# Patient Record
Sex: MALE | Race: OTHER | NOT HISPANIC OR LATINO
[De-identification: names, ages, dates, MRNs, and addresses within clinical notes are randomized per-mention and may not be internally consistent; named-entity substitution may affect disease eponyms.]

---

## 2017-02-07 ENCOUNTER — APPOINTMENT (OUTPATIENT)
Dept: UROLOGY | Facility: CLINIC | Age: 67
End: 2017-02-07

## 2017-02-16 ENCOUNTER — APPOINTMENT (OUTPATIENT)
Dept: UROLOGY | Facility: CLINIC | Age: 67
End: 2017-02-16

## 2017-07-06 ENCOUNTER — RESULT REVIEW (OUTPATIENT)
Age: 67
End: 2017-07-06

## 2017-08-03 ENCOUNTER — APPOINTMENT (OUTPATIENT)
Dept: UROLOGY | Facility: CLINIC | Age: 67
End: 2017-08-03
Payer: MEDICARE

## 2017-08-03 VITALS
SYSTOLIC BLOOD PRESSURE: 129 MMHG | TEMPERATURE: 98.8 F | HEIGHT: 69 IN | BODY MASS INDEX: 25.92 KG/M2 | DIASTOLIC BLOOD PRESSURE: 73 MMHG | WEIGHT: 175 LBS | HEART RATE: 74 BPM

## 2017-08-03 PROCEDURE — 99214 OFFICE O/P EST MOD 30 MIN: CPT

## 2017-08-07 ENCOUNTER — TRANSCRIPTION ENCOUNTER (OUTPATIENT)
Age: 67
End: 2017-08-07

## 2018-02-15 ENCOUNTER — APPOINTMENT (OUTPATIENT)
Dept: UROLOGY | Facility: CLINIC | Age: 68
End: 2018-02-15
Payer: MEDICARE

## 2018-02-15 PROCEDURE — 99214 OFFICE O/P EST MOD 30 MIN: CPT

## 2018-02-15 RX ORDER — TERBINAFINE HYDROCHLORIDE 250 MG/1
250 TABLET ORAL
Qty: 14 | Refills: 0 | Status: ACTIVE | COMMUNITY
Start: 2017-09-13

## 2018-02-15 RX ORDER — HYDROCORTISONE BUTYRATE 1 MG/G
0.1 CREAM TOPICAL
Qty: 60 | Refills: 0 | Status: ACTIVE | COMMUNITY
Start: 2017-08-15

## 2018-02-15 RX ORDER — CICLOPIROX OLAMINE 7.7 MG/G
0.77 CREAM TOPICAL
Qty: 90 | Refills: 0 | Status: ACTIVE | COMMUNITY
Start: 2017-09-13

## 2018-02-15 RX ORDER — HYDROCORTISONE ACETATE 25 MG/1
25 SUPPOSITORY RECTAL
Qty: 60 | Refills: 0 | Status: ACTIVE | COMMUNITY
Start: 2017-12-27

## 2018-02-15 RX ORDER — CLOTRIMAZOLE 10 MG/G
1 CREAM TOPICAL
Qty: 30 | Refills: 0 | Status: ACTIVE | COMMUNITY
Start: 2017-08-14

## 2018-02-15 RX ORDER — VALSARTAN 160 MG/1
160 TABLET, COATED ORAL
Qty: 90 | Refills: 0 | Status: ACTIVE | COMMUNITY
Start: 2017-12-27

## 2018-02-24 LAB
PSA FREE FLD-MCNC: 23.4
PSA FREE SERPL-MCNC: 2.23 NG/ML
PSA SERPL-MCNC: 9.55 NG/ML

## 2018-04-30 ENCOUNTER — FORM ENCOUNTER (OUTPATIENT)
Age: 68
End: 2018-04-30

## 2018-05-01 ENCOUNTER — APPOINTMENT (OUTPATIENT)
Dept: MRI IMAGING | Facility: IMAGING CENTER | Age: 68
End: 2018-05-01
Payer: MEDICARE

## 2018-05-01 ENCOUNTER — OUTPATIENT (OUTPATIENT)
Dept: OUTPATIENT SERVICES | Facility: HOSPITAL | Age: 68
LOS: 1 days | End: 2018-05-01
Payer: MEDICARE

## 2018-05-01 DIAGNOSIS — C61 MALIGNANT NEOPLASM OF PROSTATE: ICD-10-CM

## 2018-05-01 DIAGNOSIS — R97.20 ELEVATED PROSTATE SPECIFIC ANTIGEN [PSA]: ICD-10-CM

## 2018-05-01 DIAGNOSIS — Z00.00 ENCOUNTER FOR GENERAL ADULT MEDICAL EXAMINATION WITHOUT ABNORMAL FINDINGS: ICD-10-CM

## 2018-05-01 PROCEDURE — A9585: CPT

## 2018-05-01 PROCEDURE — 72197 MRI PELVIS W/O & W/DYE: CPT | Mod: 26

## 2018-05-01 PROCEDURE — 82565 ASSAY OF CREATININE: CPT

## 2018-05-01 PROCEDURE — 72197 MRI PELVIS W/O & W/DYE: CPT

## 2018-10-04 ENCOUNTER — APPOINTMENT (OUTPATIENT)
Dept: UROLOGY | Facility: CLINIC | Age: 68
End: 2018-10-04
Payer: MEDICARE

## 2018-10-04 PROCEDURE — 99214 OFFICE O/P EST MOD 30 MIN: CPT

## 2019-05-23 ENCOUNTER — APPOINTMENT (OUTPATIENT)
Dept: UROLOGY | Facility: CLINIC | Age: 69
End: 2019-05-23
Payer: MEDICARE

## 2019-05-23 DIAGNOSIS — Z00.00 ENCOUNTER FOR GENERAL ADULT MEDICAL EXAMINATION W/OUT ABNORMAL FINDINGS: ICD-10-CM

## 2019-05-23 DIAGNOSIS — R39.12 POOR URINARY STREAM: ICD-10-CM

## 2019-05-23 DIAGNOSIS — C73 MALIGNANT NEOPLASM OF THYROID GLAND: ICD-10-CM

## 2019-05-23 PROCEDURE — 99214 OFFICE O/P EST MOD 30 MIN: CPT

## 2019-06-08 ENCOUNTER — TRANSCRIPTION ENCOUNTER (OUTPATIENT)
Age: 69
End: 2019-06-08

## 2019-07-09 NOTE — LETTER BODY
[FreeTextEntry1] : Ramirez Whitley MD\par [address]\par [place], [place] [address]\par Phone [phone]\par \par Jarvis Wilkins MD\par [address] [place] [address], [address] Floor\[place] [address]\par [phone]\par \par Dear Dr. Wilkins and Dr. Whitley,\par \par Reason for Visit: BPH. Prostate cancer on active surveillance\par \par This is a 68 year-old gentleman with BPH and prostate cancer diagnosed in 2015 post prostate biopsy in 2015 and chronic BPH. Patient was found to have a small focus of [place] 6 prostate cancer. He received an unremarkable prostate MRI in 2016. Patient is on active surveillance and returns today for followup. Since the patient's last visit, he reports he is doing well. Patient's current PSA is 5.7. Patient continues to take Flomax BID regularly without any side effects or difficulties with the medication. Patient reports improvement in his urinary flow. Patient denies any urinary retention or hematuria or changes in health. The past medical history, family history and social history are unchanged. All other review of systems are negative. Patient denies any changes in medications. Medication list was reconciled.\par \par On examination, the patient is a healthy-appearing gentleman in no acute distress. He is alert and oriented follows commands. He has normal mood and affect. He is normocephalic. Neck is supple. Respirations are unlabored. His abdomen is soft and nontender. Bladder is nonpalpable. No CVA tenderness. Neurologically he is grossly intact. No peripheral edema. Skin without gross abnormality. He has normal male external genitalia. Normal meatus. Bilateral testes are descended intrascrotally and normal to palpation. On rectal examination, there is normal sphincter tone. The prostate is clinically benign without focal induration or nodularity.\par \par Patient's current PSA is 5.7, which is stable. Previous PSA was 5.6.\par \par His previous prostate MRI was unremarkable PIRAD 1.\par \par Assessment: BPH. Prostate cancer on active surveillance.\par \par I counseled the patient. In terms of his BPH, his symptoms are stable on Flomax BID. I renewed the patient's prescription for Flomax today. I encouraged the patient to continue medications regularly as directed. Risks and alternatives were discussed. I answered the patient questions. The patient will follow-up as directed and will contact me with any questions or concerns. Thank you for the opportunity to participate in the care of this patient. I'll keep you updated on his progress.\par \par Plan: Continue Flomax BID. Follow up 6 months.

## 2019-07-09 NOTE — LETTER BODY
[FreeTextEntry1] : Ramirez Whitley MD\par [address]\par [place], [place] [address]\par Phone [phone]\par \par Jarvis Wilkins MD\par [address] [place] [address], [address] Floor\[place] [address]\par [phone]\par \par Dear Dr. Wilkins and Dr. Whitley,\par \par Reason for Visit: BPH. Prostate cancer on active surveillance\par \par This is a 68 year-old gentleman with BPH and prostate cancer diagnosed in 2015 post prostate biopsy in 2015 and chronic BPH. Patient was found to have a small focus of [place] 6 prostate cancer. He received an unremarkable prostate MRI in 2016. Patient is on active surveillance and returns today for followup. Since the patient's last visit, he reports he is doing well. Patient's current PSA is 5.7. Patient continues to take Flomax BID regularly without any side effects or difficulties with the medication. Patient reports improvement in his urinary flow. Patient denies any urinary retention or hematuria or changes in health. The past medical history, family history and social history are unchanged. All other review of systems are negative. Patient denies any changes in medications. Medication list was reconciled.\par \par On examination, the patient is a healthy-appearing gentleman in no acute distress. He is alert and oriented follows commands. He has normal mood and affect. He is normocephalic. Neck is supple. Respirations are unlabored. His abdomen is soft and nontender. Bladder is nonpalpable. No CVA tenderness. Neurologically he is grossly intact. No peripheral edema. Skin without gross abnormality. He has normal male external genitalia. Normal meatus. Bilateral testes are descended intrascrotally and normal to palpation. On rectal examination, there is normal sphincter tone. The prostate is clinically benign without focal induration or nodularity.\par \par Patient's current PSA is 5.7, which is stable. Previous PSA was 5.6.\par \par His previous prostate MRI was unremarkable PIRAD 1.\par \par Assessment: BPH. Prostate cancer on active surveillance.\par \par I counseled the patient. In terms of his BPH, his symptoms are stable on Flomax BID. I renewed the patient's prescription for Flomax today. I encouraged the patient to continue medications regularly as directed. Risks and alternatives were discussed. I answered the patient questions. The patient will follow-up as directed and will contact me with any questions or concerns. Thank you for the opportunity to participate in the care of this patient. I'll keep you updated on his progress.\par \par Plan: Continue Flomax BID. Follow up 6 months.

## 2019-07-09 NOTE — ADDENDUM
[FreeTextEntry1] : Entered by Genna Rand, acting as scribe for Dr. David Haines on 05/23/2019\par \par The documentation recorded by the scribe accurately reflects the service I personally performed and the decisions made by me.\par \par

## 2020-01-30 ENCOUNTER — APPOINTMENT (OUTPATIENT)
Dept: UROLOGY | Facility: CLINIC | Age: 70
End: 2020-01-30
Payer: MEDICARE

## 2020-01-30 PROCEDURE — 99214 OFFICE O/P EST MOD 30 MIN: CPT

## 2020-01-30 NOTE — LETTER BODY
[FreeTextEntry1] : Jarvis Wilkins MD\par [address] [place] [address], [address] Floor\[place] [address]\par [phone]\par \par Dear Dr. Wilkins,\par \par Reason for Visit: BPH. Prostate cancer on active surveillance\par \par This is a 69 year-old gentleman with chronic BPH and prostate cancer diagnosed in 2015. Patient was found to have a small focus of [place] 6 prostate cancer. Patient is on active surveillance. He obtained negative prostate MRI's in 2016 and 2018. He returns today for follow up. Patient's current PSA is 11. Since his last visit, he reports that he was sick with an excess of phlegm and mucus, and was placed on antibiotics. He is unclear of the etiology of his symptoms and was not diagnosed with an illness. He reports he did not have a headache or dysuria. He reports he is feeling much better now. He inquires if his illness could have led to the spike in his PSA and reports that he will be repeating PSA testing in 3 months. Patient continues to take Flomax BID regularly without any side effects or difficulties. Patient reports of stable urinary symptoms and strong uroflow. He reports of occasional nocturia if he hydrates frequently before bed. Patient denies any urinary retention, dysuria, or hematuria. He denies any changes in health. He has no pain currently. The past medical history, family history and social history are unchanged. All other review of systems are negative. Patient denies any changes in medications. Medication list was reconciled. He has no known allergies to medication.\par \par On examination, the patient is a healthy-appearing gentleman in no acute distress. He is alert and oriented follows commands. He has normal mood and affect. He is normocephalic. Neck is supple. Respirations are unlabored. His abdomen is soft and nontender. Bladder is nonpalpable. No CVA tenderness. Neurologically he is grossly intact. No peripheral edema. Skin without gross abnormality. He has normal male external genitalia. Normal meatus. Bilateral testes are descended intrascrotally and normal to palpation. On rectal examination, there is normal sphincter tone. The prostate is clinically benign without focal induration or nodularity.\par \par His BMP demonstrated decreased. renal functions, creatinine 1.27. Patient's current PSA is 11, which is elevated. His previous PSA was 5.7.\par \par Assessment: BPH. Elevated PSA. Prostate cancer on active surveillance.\par \par I counseled the patient. I explained that his illness could have affected his PSA, leading to its sudden spike. I recommend he contact me with his repeat PSA results to ensure stability. I also recommend he add Proscar. I discussed the potential side effects of the medication. I counseled the patient on its use and side effects. If the patient develops any side effects, the patient will discontinue the medication and contact me. In terms of his BPH, his symptoms are stable on medication. I renewed his prescription for Flomax BID today. I encouraged him to continue medication regularly as directed. Risks and alternatives were discussed. I answered the patient questions. The patient will follow-up as directed and will contact me with any questions or concerns. Thank you for the opportunity to participate in the care of Mr. BARNES. I will keep you updated on his progress. \par \par Plan: Continue Flomax BID. Add Proscar. Active surveillance. Follow up as directed.

## 2020-01-30 NOTE — ADDENDUM
[FreeTextEntry1] : Entered by Ronny Oconnor, acting as scribe for Dr. David Haines.\par \par The documentation recorded by the scribe accurately reflects the service I personally performed and the decisions made by me.

## 2020-04-24 ENCOUNTER — TRANSCRIPTION ENCOUNTER (OUTPATIENT)
Age: 70
End: 2020-04-24

## 2020-06-12 ENCOUNTER — TRANSCRIPTION ENCOUNTER (OUTPATIENT)
Age: 70
End: 2020-06-12

## 2020-06-16 ENCOUNTER — APPOINTMENT (OUTPATIENT)
Dept: UROLOGY | Facility: CLINIC | Age: 70
End: 2020-06-16
Payer: MEDICARE

## 2020-06-16 PROCEDURE — 99214 OFFICE O/P EST MOD 30 MIN: CPT | Mod: 95

## 2020-06-16 NOTE — LETTER BODY
[FreeTextEntry1] : Jarvis Wilkins MD\par [address] [place] [address], [address]\[place] [address]\par [phone]\par \par Dear Dr. Wilkins,\par \par Reason for Visit: BPH. Prostate cancer on active surveillance\par \par This is a 69 year-old gentleman with chronic BPH and prostate cancer diagnosed in 2015. Patient was found to have a small focus of [place] 6 prostate cancer. Patient is on active surveillance. He obtained negative prostate MRI's in 2016 and 2018. His prostate MRI in May 2018 demonstrated PI-RADS 1. Patient was at home and consulted over the phone through a TeleHealth visit. Verbal consent was obtained. I was in the office in [place]. Patient's current PSA is 4.06. Since his last visit, he reports taking Flomax BID and Proscar regularly without any side effects or difficulties. Patient reports of stable urinary symptoms and strong uroflow with medication. Patient denies any urinary retention, dysuria, or hematuria. He denies any changes in health. He has no pain currently. The past medical history, family history and social history are unchanged. All other review of systems are negative. Patient denies any changes in medications. Medication list was reconciled. He has no known allergies to medication.\par \par Given that this was a TeleHealth visit and I was unable to physically examine the patient, his/her physical examination is deferred. \par \par Patient's current PSA on Proscar is 4.06.\par \par Assessment: BPH, stable with Flomax BID. Elevated PSA, improved with Proscar. Prostate cancer on active surveillance.\par \par I counseled the patient. In terms of his BPH, his symptoms are stable with medical therapy. In terms of his elevated PSA, his PSA has improved with Proscar. I renewed the patient's prescription for Flomax BID and Proscar today. I encouraged the patient to continue medications regularly as directed. Risks and alternatives were discussed. I answered the patient questions. The patient will follow-up as directed and will contact me with any questions or concerns. Thank you for the opportunity to participate in the care of Mr. BARNES. I will keep you updated on his progress. \par \par Plan: Continue Flomax BID and Proscar. Active surveillance. Follow up in 6 months.

## 2020-06-16 NOTE — HISTORY OF PRESENT ILLNESS
[Home] : at home, [unfilled] , at the time of the visit. [Medical Office: ([facility])___] : at the medical office located in  [Verbal consent obtained from patient] : the patient, [unfilled] [FreeTextEntry1] : CONFIRMED PT's PHONE#: [phone]\par \par The patient-doctor relationship has been established in a face to face fashion via real time video/audio HIPAA compliant communication using telemedicine software. The patient's identity has been confirmed. The patient was previously emailed a copy of the telemedicine consent. They have had a chance to review and has now given verbal consent and has requested care to be assessed and treated through telemedicine and understands there maybe limitations in this process and they may need further follow up care in the office and or hospital settings. \par \par Please refer to URO Consult note

## 2020-08-21 ENCOUNTER — TRANSCRIPTION ENCOUNTER (OUTPATIENT)
Age: 70
End: 2020-08-21

## 2020-10-04 ENCOUNTER — TRANSCRIPTION ENCOUNTER (OUTPATIENT)
Age: 70
End: 2020-10-04

## 2021-01-28 ENCOUNTER — APPOINTMENT (OUTPATIENT)
Dept: UROLOGY | Facility: CLINIC | Age: 71
End: 2021-01-28
Payer: MEDICARE

## 2021-01-28 PROCEDURE — 99214 OFFICE O/P EST MOD 30 MIN: CPT

## 2021-01-28 NOTE — LETTER BODY
[FreeTextEntry1] : Jarvis Wilkins MD\par [address] [place] [address], [address] Floor\[place] [address]\par [phone]\par \par Dear Dr. Wilkins,\par \par Reason for Visit: BPH. Prostate cancer on active surveillance\par \par This is a 70 year-old gentleman with chronic BPH and prostate cancer diagnosed in 2015. Patient was found to have a small focus of [place] 6 prostate cancer. Patient is on active surveillance. He obtained negative prostate MRI's in 2016 and 2018. His prostate MRI in May 2018 demonstrated PI-RADS 1. The patient returns today for follow-up. Since his last visit, he reports taking Flomax BID and Proscar regularly without any side effects or difficulties. He reports of stable strong uroflow and stable urinary symptoms with medical therapy. Patient denies any urinary retention, dysuria, pain, or hematuria. Patient denies any changes in health. The past medical history, family history and social history are unchanged. All other review of systems are negative. Patient denies any changes in medications. Medication list was reconciled. He has no known allergies to medication.\par \par On examination, the patient is an older-appearing gentleman in no acute distress. He is alert and oriented follows commands. He has normal mood and affect. He is normocephalic. Neck is supple. Oral no thrush Respirations are unlabored. His abdomen is soft and nontender. Bladder is nonpalpable. No CVA tenderness. Neurologically he is grossly intact. No peripheral edema. Skin without gross abnormality. He has normal male external genitalia. Normal meatus. Bilateral testes are descended intrascrotally and normal to palpation. On rectal examination, there is normal sphincter tone. The prostate is clinically benign without focal induration or nodularity.\par \par His PSA is 2.5 on Proscar, which is improved and within normal limits.\par \par Assessment: BPH, stable with Flomax BID. Elevated PSA, improved on Proscar. Prostate cancer on active surveillance.\par \par I counseled the patient. In terms of his BPH, the patient has stable symptoms on medical therapy. I recommended he continue taking Flomax BID. In terms of his elevated PSA, I reassured the patient. His PSA has improved on Proscar to 2.5. I recommended he continue taking Proscar. I renewed the patient's prescription for Flomax BID and Proscar today. I encouraged the patient to continue medications regularly as directed. I recommended he continue with active surveillance of his prostate cancer. Risks and alternatives were discussed. I answered the patient questions. The patient will follow-up in 6 months and will contact me with any questions or concerns. Thank you for the opportunity to participate in the care of Mr. BARNES. I will keep you updated on his progress. \par \par Plan: Continue Flomax BID and Proscar. Continue active surveillance. Follow up in 6 months.

## 2021-01-28 NOTE — ADDENDUM
[FreeTextEntry1] : Entered by Ramirez Caban, acting as scribe for Dr. David Haines.\par \par The documentation recorded by the scribe accurately reflects the service I personally performed and the decisions made by me.\par

## 2021-08-16 ENCOUNTER — TRANSCRIPTION ENCOUNTER (OUTPATIENT)
Age: 71
End: 2021-08-16

## 2021-09-23 ENCOUNTER — APPOINTMENT (OUTPATIENT)
Dept: UROLOGY | Facility: CLINIC | Age: 71
End: 2021-09-23
Payer: MEDICARE

## 2021-09-23 PROCEDURE — 99214 OFFICE O/P EST MOD 30 MIN: CPT

## 2021-09-23 NOTE — ADDENDUM
[FreeTextEntry1] : Entered by Sav Renee, acting as scribe for Dr. David Haines.\par \par The documentation recorded by the scribe accurately reflects the service I personally performed and the decisions made by me.

## 2021-09-23 NOTE — LETTER BODY
[FreeTextEntry1] : Jarvis Wilkins MD\par [address] [place] [address], [address] Floor\[place] [address]\par [phone]\par \par Dear Dr. Wilkins,\par \par Reason for Visit: BPH. Prostate cancer on active surveillance\par \par This is a 71 year-old gentleman with chronic BPH and prostate cancer diagnosed in 2015. Patient was found to have a small focus of [place] 6 prostate cancer. Patient is on active surveillance. He obtained negative prostate MRI's in 2016 and 2018. His prostate MRI in May 2018 demonstrated PI-RADS 1. The patient returns today for follow-up. Since his last visit, he reports taking Flomax BID and Proscar regularly without any side effects or difficulties. He reports of stable strong uroflow and stable urinary symptoms with medical therapy. Patient denies any urinary retention, dysuria, pain, or hematuria. Patient denies any changes in health. The past medical history, family history and social history are unchanged. All other review of systems are negative. Patient denies any changes in medications. Medication list was reconciled. He has no known allergies to medication.\par \par On examination, the patient is an older-appearing gentleman in no acute distress. He is alert and oriented follows commands. He has normal mood and affect. He is normocephalic. Neck is supple. Oral no thrush Respirations are unlabored. His abdomen is soft and nontender. Bladder is nonpalpable. No CVA tenderness. Neurologically he is grossly intact. No peripheral edema. Skin without gross abnormality. He has normal male external genitalia. Normal meatus. Bilateral testes are descended intrascrotally and normal to palpation. On rectal examination, there is normal sphincter tone. The prostate is clinically benign without focal induration or nodularity.\par \par His BMP from June demonstrated normal renal functions, creatinine 1.21. His PSA is 2.2 on Proscar, which is improved and within normal limits. \par \par Assessment: BPH, stable with Flomax BID. Elevated PSA, improved on Proscar. Prostate cancer on active surveillance.\par \par I counseled the patient. In terms of his BPH, the patient has stable symptoms on medical therapy. I recommended he continue taking Flomax BID. In terms of his elevated PSA, I reassured the patient. His PSA has improved on Proscar to 2.2. I recommended he continue taking Proscar. I renewed the patient's prescription for Flomax BID and Proscar today. I encouraged the patient to continue medications regularly as directed. I recommended he continue with active surveillance of his prostate cancer. Risks and alternatives were discussed. I answered the patient questions. The patient will follow-up in 6 months and will contact me with any questions or concerns. Thank you for the opportunity to participate in the care of Mr. BARNES. I will keep you updated on his progress. \par \par Plan: Continue Flomax BID and Proscar. Continue active surveillance. Follow up in 6 months. No

## 2021-09-23 NOTE — HISTORY OF PRESENT ILLNESS
[FreeTextEntry1] : Please refer to URO Consult note \par \par fu prostate cancer bph \par flomax and proscar \par PSA 2.2 improved \par come back in 6 months

## 2022-01-11 ENCOUNTER — TRANSCRIPTION ENCOUNTER (OUTPATIENT)
Age: 72
End: 2022-01-11

## 2022-01-15 ENCOUNTER — APPOINTMENT (OUTPATIENT)
Dept: DISASTER EMERGENCY | Facility: HOSPITAL | Age: 72
End: 2022-01-15

## 2022-01-15 ENCOUNTER — OUTPATIENT (OUTPATIENT)
Dept: OUTPATIENT SERVICES | Facility: HOSPITAL | Age: 72
LOS: 1 days | End: 2022-01-15
Payer: MEDICARE

## 2022-01-15 VITALS
DIASTOLIC BLOOD PRESSURE: 79 MMHG | RESPIRATION RATE: 16 BRPM | HEART RATE: 78 BPM | SYSTOLIC BLOOD PRESSURE: 128 MMHG | OXYGEN SATURATION: 97 % | TEMPERATURE: 99 F

## 2022-01-15 VITALS
OXYGEN SATURATION: 98 % | TEMPERATURE: 99 F | WEIGHT: 177.91 LBS | RESPIRATION RATE: 20 BRPM | DIASTOLIC BLOOD PRESSURE: 89 MMHG | SYSTOLIC BLOOD PRESSURE: 152 MMHG | HEART RATE: 96 BPM | HEIGHT: 68 IN

## 2022-01-15 DIAGNOSIS — U07.1 COVID-19: ICD-10-CM

## 2022-01-15 PROCEDURE — M0247: CPT

## 2022-01-15 RX ORDER — SOTROVIMAB 62.5 MG/ML
500 INJECTION, SOLUTION, CONCENTRATE INTRAVENOUS ONCE
Refills: 0 | Status: COMPLETED | OUTPATIENT
Start: 2022-01-15 | End: 2022-01-15

## 2022-01-15 RX ORDER — SODIUM CHLORIDE 9 MG/ML
250 INJECTION INTRAMUSCULAR; INTRAVENOUS; SUBCUTANEOUS
Refills: 0 | Status: DISCONTINUED | OUTPATIENT
Start: 2022-01-15 | End: 2022-01-29

## 2022-01-15 RX ADMIN — SOTROVIMAB 116 MILLIGRAM(S): 62.5 INJECTION, SOLUTION, CONCENTRATE INTRAVENOUS at 09:36

## 2022-01-15 RX ADMIN — SODIUM CHLORIDE 100 MILLILITER(S): 9 INJECTION INTRAMUSCULAR; INTRAVENOUS; SUBCUTANEOUS at 09:36

## 2022-01-15 NOTE — CHART NOTE - PRIOR COVID TREATMENT
I have reviewed the Regeneron Emergency Use Authorization (EUA) and I have provided the patient or patient's caregiver with the following information:    1.FDA has authorized emergency use Regeneron which is not an FDA-approved biological product.  2.The patient or patient's caregiver has the option to accept or refuse administration of Regeneron.  3.The significant known and potential risks and benefits of Regeneron and the extent to which such risks and benefits are unknown.  4.Information on available alternative treatments and risks and benefits of those alternatives.  5.Pt. verbalized understanding of plan and agrees with same.  6.All questions answered.

## 2022-03-17 ENCOUNTER — APPOINTMENT (OUTPATIENT)
Dept: UROLOGY | Facility: CLINIC | Age: 72
End: 2022-03-17
Payer: MEDICARE

## 2022-03-17 PROCEDURE — 99214 OFFICE O/P EST MOD 30 MIN: CPT

## 2022-03-17 NOTE — LETTER BODY
[FreeTextEntry1] : Jarvis Wilkins MD\par [address] [place] [address], [address] Floor\[place] [address]\par [phone]\par \par Dear Dr. Wilkins,\par \par Reason for Visit: BPH. Prostate cancer on active surveillance\par \par This is a 71 year-old gentleman with chronic BPH and prostate cancer diagnosed in 2015. Patient was found to have a small focus of [place] 6 prostate cancer. He obtained negative prostate MRI's in 2016 and 2018. His prostate MRI in May 2018 demonstrated PI-RADS 1. He is on active surveillance. The patient returns today for follow-up. Since his last visit, the patient reports taking Flomax BID and Proscar regularly without any side effects or difficulties. He reports of stable strong uroflow and stable urinary symptoms with medical therapy. Patient denies any urinary retention, dysuria, pain, or hematuria. Patient denies any changes in health. The past medical history, family history and social history are unchanged. All other review of systems are negative. Patient denies any changes in medications. Medication list was reconciled. He has no known allergies to medication.\par \par On examination, the patient is an older-appearing gentleman in no acute distress. He is alert and oriented follows commands. He has normal mood and affect. He is normocephalic. Neck is supple. Oral no thrush Respirations are unlabored. His abdomen is soft and nontender. Bladder is nonpalpable. No CVA tenderness. Neurologically he is grossly intact. No peripheral edema. Skin without gross abnormality. He has normal male external genitalia. Normal meatus. Bilateral testes are descended intrascrotally and normal to palpation. On rectal examination, there is normal sphincter tone. The prostate is clinically benign without focal induration or nodularity.\par \par His recent BMP demonstrated normal renal functions, creatinine 1.08. His PSA is 2.49 on Proscar, which is within normal limits. His urinalysis was unremarkable. \par \par Assessment: BPH, stable with Flomax BID. Elevated PSA, improved on Proscar. Prostate cancer on active surveillance.\par \par I counseled the patient. He is doing well. In terms of his BPH, the patient reports stable urinary symptoms on medical therapy. I recommended he continue taking Flomax BID. In terms of his elevated PSA, I reassured the patient as his PSA remains stable and within normal limits. I recommended he continue taking Proscar. I renewed the patient's prescription for Flomax BID and Proscar today. I encouraged the patient to continue medications regularly as directed. I recommended he continue with active surveillance of his prostate cancer. Risks and alternatives were discussed. I answered the patient questions. The patient will follow-up in 6 months and will contact me with any questions or concerns. Thank you for the opportunity to participate in the care of Mr. BARNES. I will keep you updated on his progress. \par \par Plan: Continue Flomax BID and Proscar. Continue active surveillance. Follow up in 6 months.

## 2022-10-13 ENCOUNTER — APPOINTMENT (OUTPATIENT)
Dept: UROLOGY | Facility: CLINIC | Age: 72
End: 2022-10-13

## 2022-10-13 PROCEDURE — 99214 OFFICE O/P EST MOD 30 MIN: CPT

## 2022-10-13 NOTE — LETTER BODY
[FreeTextEntry1] : Jarvis Wilkins MD\par [phone] [place] [address], [address] Floor\[place] [address]\par [phone]\par \par Dear Dr. Wilkins,\par \par Reason for Visit: BPH. Prostate cancer on active surveillance\par \par This is a 72 year-old gentleman with chronic BPH and prostate cancer diagnosed in 2015. Patient was found to have a small focus of Jonas 6 prostate cancer. He obtained negative prostate MRI's in 2016 and 2018. His prostate MRI in May 2018 demonstrated PI-RADS 1. He is on active surveillance. The patient returns today for follow-up. Since his last visit, the patient reports taking Flomax BID and Proscar regularly without any side effects or difficulties. He reports of stable strong uroflow and stable urinary symptoms with medical therapy. Patient denies any urinary retention, dysuria, pain, or hematuria. Patient denies any changes in health. The past medical history, family history and social history are unchanged. All other review of systems are negative. Patient denies any changes in medications. Medication list was reconciled. He has no known allergies to medication.\par \par On examination, the patient is an older-appearing gentleman in no acute distress. He is alert and oriented follows commands. He has normal mood and affect. He is normocephalic. Neck is supple. Oral no thrush Respirations are unlabored. His abdomen is soft and nontender. Bladder is nonpalpable. No CVA tenderness. Neurologically he is grossly intact. No peripheral edema. Skin without gross abnormality. He has normal male external genitalia. Normal meatus. Bilateral testes are descended intrascrotally and normal to palpation. On rectal examination, there is normal sphincter tone. The prostate is clinically benign without focal induration or nodularity.\par \par Patient had a PSA 4 years ago of 9.55. This year, his recent BMP demonstrated normal renal functions, creatinine 1.15. His PSA is 2.42 on Proscar, which is within normal limits.. \par \par Assessment: BPH, stable with Flomax BID. Elevated PSA, improved on Proscar. Prostate cancer on active surveillance.\par \par I counseled the patient. He is doing well. In terms of his BPH, the patient reports stable urinary symptoms on medical therapy. I recommended he continue taking Flomax BID. In terms of his elevated PSA, I reassured the patient as his PSA remains stable and within normal limits. I recommended he continue taking Proscar. I renewed the patient's prescription for Flomax BID and Proscar today. I encouraged the patient to continue medications regularly as directed. I recommended he continue with active surveillance of his prostate cancer. Risks and alternatives were discussed. I answered the patient questions. The patient will follow-up in 6 months and will contact me with any questions or concerns. Thank you for the opportunity to participate in the care of Mr. BARNES. I will keep you updated on his progress. \par \par Plan: Continue Flomax BID and Proscar. Continue active surveillance. Follow up in 6 months.

## 2022-10-13 NOTE — ADDENDUM
[FreeTextEntry1] : Entered by Marek Denis, acting as scribe for Dr. David Haines.\par The documentation recorded by the scribe accurately reflects the service I personally performed and the decisions made by me.

## 2023-05-04 ENCOUNTER — APPOINTMENT (OUTPATIENT)
Dept: UROLOGY | Facility: CLINIC | Age: 73
End: 2023-05-04
Payer: MEDICARE

## 2023-05-04 DIAGNOSIS — N40.1 BENIGN PROSTATIC HYPERPLASIA WITH LOWER URINARY TRACT SYMPMS: ICD-10-CM

## 2023-05-04 DIAGNOSIS — C61 MALIGNANT NEOPLASM OF PROSTATE: ICD-10-CM

## 2023-05-04 PROCEDURE — 99214 OFFICE O/P EST MOD 30 MIN: CPT

## 2023-05-04 RX ORDER — TAMSULOSIN HYDROCHLORIDE 0.4 MG/1
0.4 CAPSULE ORAL
Qty: 180 | Refills: 3 | Status: ACTIVE | COMMUNITY
Start: 2017-02-16 | End: 1900-01-01

## 2023-05-04 NOTE — LETTER BODY
[FreeTextEntry1] : Jarvis Wilkins MD\par [address] [place] [address], [address]\[place] [address]\par [phone]\par \par Dear Dr. Wilkins,\par \par Reason for Visit: BPH. Prostate cancer on active surveillance\par \par This is a 72 year-old gentleman with chronic BPH and prostate cancer diagnosed in 2015. Patient was found to have a small focus of Jonas 6 prostate cancer. He obtained negative prostate MRI's in 2016 and 2018. His prostate MRI in May 2018 demonstrated PI-RADS 1. He is on active surveillance. The patient returns today for follow-up. Since his last visit, the patient reports taking Flomax BID and Proscar regularly without any side effects or difficulties. He reports of stable strong uroflow and stable urinary symptoms with medical therapy. Patient denies any urinary retention, dysuria, pain, or hematuria. Patient denies any changes in health. The past medical history, family history and social history are unchanged. All other review of systems are negative. Patient denies any changes in medications. Medication list was reconciled. He has no known allergies to medication.\par \par On examination, the patient is an older-appearing gentleman in no acute distress. He is alert and oriented follows commands. He has normal mood and affect. He is normocephalic. Neck is supple. Oral no thrush Respirations are unlabored. His abdomen is soft and nontender. Bladder is nonpalpable. No CVA tenderness. Neurologically he is grossly intact. No peripheral edema. Skin without gross abnormality. He has normal male external genitalia. Normal meatus. Bilateral testes are descended intrascrotally and normal to palpation. On rectal examination, there is normal sphincter tone. The prostate is clinically benign without focal induration or nodularity.\par \par His recent PSA is 3.8.  This is stable.  Patient admits he had held his finasteride for several months.  He is now on both Flomax and Proscar regularly.\par \par Assessment: BPH, stable with Flomax BID. Elevated PSA, improved on Proscar. Prostate cancer on active surveillance.\par \par I counseled the patient. He is doing well. In terms of his BPH, the patient reports stable urinary symptoms on medical therapy. I recommended he continue taking Flomax BID. In terms of his elevated PSA, I reassured the patient as his PSA remains stable and within normal limits. I recommended he continue taking Proscar. I renewed the patient's prescription for Flomax BID and Proscar today. I encouraged the patient to continue medications regularly as directed. I recommended he continue with active surveillance of his prostate cancer. Risks and alternatives were discussed. I answered the patient questions. The patient will follow-up in 6 months and will contact me with any questions or concerns. Thank you for the opportunity to participate in the care of Mr. BARNES. I will keep you updated on his progress. \par \par Plan: Continue Flomax BID and Proscar. Continue active surveillance. Follow up in 6 months. \par \par

## 2023-06-17 ENCOUNTER — NON-APPOINTMENT (OUTPATIENT)
Age: 73
End: 2023-06-17

## 2023-10-10 ENCOUNTER — EMERGENCY (EMERGENCY)
Facility: HOSPITAL | Age: 73
LOS: 1 days | Discharge: HOME | End: 2023-10-10
Attending: EMERGENCY MEDICINE | Admitting: EMERGENCY MEDICINE
Payer: MEDICARE

## 2023-10-10 VITALS
HEART RATE: 84 BPM | RESPIRATION RATE: 18 BRPM | DIASTOLIC BLOOD PRESSURE: 69 MMHG | OXYGEN SATURATION: 91 % | SYSTOLIC BLOOD PRESSURE: 127 MMHG | TEMPERATURE: 98 F

## 2023-10-10 VITALS
TEMPERATURE: 98 F | WEIGHT: 175.93 LBS | DIASTOLIC BLOOD PRESSURE: 81 MMHG | SYSTOLIC BLOOD PRESSURE: 140 MMHG | HEART RATE: 90 BPM | RESPIRATION RATE: 18 BRPM | OXYGEN SATURATION: 99 %

## 2023-10-10 PROCEDURE — 72110 X-RAY EXAM L-2 SPINE 4/>VWS: CPT | Mod: 26

## 2023-10-10 PROCEDURE — 73501 X-RAY EXAM HIP UNI 1 VIEW: CPT

## 2023-10-10 PROCEDURE — 96372 THER/PROPH/DIAG INJ SC/IM: CPT

## 2023-10-10 PROCEDURE — 72110 X-RAY EXAM L-2 SPINE 4/>VWS: CPT

## 2023-10-10 PROCEDURE — 99284 EMERGENCY DEPT VISIT MOD MDM: CPT

## 2023-10-10 PROCEDURE — 73501 X-RAY EXAM HIP UNI 1 VIEW: CPT | Mod: 26,RT

## 2023-10-10 PROCEDURE — 99284 EMERGENCY DEPT VISIT MOD MDM: CPT | Mod: 25

## 2023-10-10 RX ORDER — OXYCODONE HYDROCHLORIDE 5 MG/1
1 TABLET ORAL
Qty: 16 | Refills: 0
Start: 2023-10-10 | End: 2023-10-13

## 2023-10-10 RX ORDER — KETOROLAC TROMETHAMINE 30 MG/ML
30 SYRINGE (ML) INJECTION ONCE
Refills: 0 | Status: DISCONTINUED | OUTPATIENT
Start: 2023-10-10 | End: 2023-10-10

## 2023-10-10 RX ORDER — OXYCODONE HYDROCHLORIDE 5 MG/1
5 TABLET ORAL ONCE
Refills: 0 | Status: DISCONTINUED | OUTPATIENT
Start: 2023-10-10 | End: 2023-10-10

## 2023-10-10 RX ORDER — ACETAMINOPHEN 500 MG
975 TABLET ORAL ONCE
Refills: 0 | Status: COMPLETED | OUTPATIENT
Start: 2023-10-10 | End: 2023-10-10

## 2023-10-10 RX ADMIN — Medication 30 MILLIGRAM(S): at 10:43

## 2023-10-10 RX ADMIN — Medication 975 MILLIGRAM(S): at 10:35

## 2023-10-10 RX ADMIN — OXYCODONE HYDROCHLORIDE 5 MILLIGRAM(S): 5 TABLET ORAL at 12:52

## 2023-10-10 NOTE — ED ADULT NURSE NOTE - OBJECTIVE STATEMENT
73yr old male a&ox4 arrives to ED from home noted rt lower extremity pain, pt notes pain resulted in fall, no thinners or loc, no obvious trauma noted. extremity warm to touch, + cap refill, pt noted to be able to move extremity with minimal limitation. pt denies fever chills, chest pain or sob at this time. Kisha PEDERSEN

## 2023-10-10 NOTE — ED ADULT NURSE NOTE - CAS EDP DISCH TYPE
Call received at 652am  65year old patient last seen in the office on 7/27/2021    Patient has seen her recent pap smear results and is wondering how to proceed    If medication needed patient wanted to make sure you checked her allergies    Pharmacy confirmed      Please review and advise    Thank you
Kavitha Hernandez MD   8/2/2021 10:44 PM EDT Back to Top      Normal pap smear, message sent if [address] active. Update PMH/HM: include: Date of pap, Cytology: wnl. For HR HPV results: list NEG or POS, when done. If having sx: mc message sent: can send flagyl 500mg bid x7d          Patient Communication  Notify if not seen in 4 days (8/7/2021)  Back to Top    Your pap smear results are normal.  There is some bacterial imbalance seen on the pap smear that can be observed (or treated if you are having symptoms: odor/discharge).    ... Patient advised of MD reviewed labs and recommendations     Patient is requesting the prescription since she is having an odor. Prescription sent as per MD order to patient preferred pharmacy    Patient was provided with instructions for taking the medication    Patient verbalized understanding.
See  message.     Paris Bee MD
Home

## 2023-10-10 NOTE — ED ADULT NURSE NOTE - NSFALLUNIVINTERV_ED_ALL_ED
Bed/Stretcher in lowest position, wheels locked, appropriate side rails in place/Call bell, personal items and telephone in reach/Instruct patient to call for assistance before getting out of bed/chair/stretcher/Non-slip footwear applied when patient is off stretcher/[place] to call system/Physically safe environment - no spills, clutter or unnecessary equipment/Purposeful proactive rounding/Room/bathroom lighting operational, light cord in reach

## 2023-10-10 NOTE — ED PROVIDER NOTE - CARE PROVIDER_API CALL
Jennifer Mcknight  Orthopaedic Surgery  [address] [facility], [place] [address]  Phone: [phone]  Fax: [phone]  Follow Up Time:

## 2023-10-10 NOTE — ED PROVIDER NOTE - PHYSICAL EXAMINATION
Gen: alert, NAD  HEENT:  NC/AT, PERR, no exophthalmos  CV:  well perfused, rrr   Pulm:  normal RR, breathing comfortably, CTA b/l  Abd: s/nt/nd  MSK: moving all extremities, able to lift the RLE and bend the knee without increased pain  Neuro:  non-focal  Skin:  visualized areas intact  Psych: AOx3

## 2023-10-10 NOTE — ED PROVIDER NOTE - PATIENT PORTAL LINK FT
You can access the FollowMyHealth Patient Portal offered by [facility] by registering at the following website: [email or web address]. By joining [facility] FollowMyHealth portal, you will also be able to view your health information using other applications (apps) compatible with our system.

## 2023-10-10 NOTE — ED PROVIDER NOTE - OBJECTIVE STATEMENT
pt with R sided shooting pain from R buttock down the back of R leg that started yesterday, today bc of pain he was limping and states that his R leg gave out and he fell, lowered himself to the ground, c/o R hip pain. denies any LOC or head trauma, not on any blood thinner.

## 2023-10-10 NOTE — ED PROVIDER NOTE - NSFOLLOWUPINSTRUCTIONS_ED_ALL_ED_FT
-- You should update your primary care physician on your Emergency Department visit and follow up with them.  If you do not have a physician or have difficulty following up, please call: [phone] to obtain a [facility] doctor or specialist who can provide follow up.    -- Take ibuprofen 600 mg every 6 hours with food, as needed for pain    -- Take tylenol 975-1000 mg every 4 hours, as needed for pain    -- Take oxycodone as needed for severe pain every 4-6 hours.    -- Follow up with spine doctor referral.    -- Return to the ER for worsening or persistent symptoms, and/or ANY NEW OR CONCERNING SYMPTOMS.

## 2023-10-17 ENCOUNTER — APPOINTMENT (OUTPATIENT)
Dept: ORTHOPEDIC SURGERY | Facility: CLINIC | Age: 73
End: 2023-10-17
Payer: MEDICARE

## 2023-10-17 VITALS — BODY MASS INDEX: 26.07 KG/M2 | WEIGHT: 176 LBS | HEIGHT: 69 IN

## 2023-10-17 PROCEDURE — 99203 OFFICE O/P NEW LOW 30 MIN: CPT

## 2023-10-31 ENCOUNTER — APPOINTMENT (OUTPATIENT)
Dept: PAIN MANAGEMENT | Facility: CLINIC | Age: 73
End: 2023-10-31
Payer: MEDICARE

## 2023-10-31 VITALS — WEIGHT: 176 LBS | HEIGHT: 69 IN | BODY MASS INDEX: 26.07 KG/M2

## 2023-10-31 DIAGNOSIS — M54.50 LOW BACK PAIN, UNSPECIFIED: ICD-10-CM

## 2023-10-31 DIAGNOSIS — M54.17 RADICULOPATHY, LUMBOSACRAL REGION: ICD-10-CM

## 2023-10-31 PROCEDURE — 99204 OFFICE O/P NEW MOD 45 MIN: CPT

## 2023-11-16 ENCOUNTER — APPOINTMENT (OUTPATIENT)
Dept: ORTHOPEDIC SURGERY | Facility: CLINIC | Age: 73
End: 2023-11-16

## 2023-11-30 ENCOUNTER — APPOINTMENT (OUTPATIENT)
Dept: ORTHOPEDIC SURGERY | Facility: CLINIC | Age: 73
End: 2023-11-30
Payer: MEDICARE

## 2023-11-30 DIAGNOSIS — M51.26 OTHER INTERVERTEBRAL DISC DISPLACEMENT, LUMBAR REGION: ICD-10-CM

## 2023-11-30 PROCEDURE — 99213 OFFICE O/P EST LOW 20 MIN: CPT

## 2023-12-19 ENCOUNTER — NON-APPOINTMENT (OUTPATIENT)
Age: 73
End: 2023-12-19

## 2023-12-20 ENCOUNTER — NON-APPOINTMENT (OUTPATIENT)
Age: 73
End: 2023-12-20

## 2023-12-20 ENCOUNTER — APPOINTMENT (OUTPATIENT)
Dept: ORTHOPEDIC SURGERY | Facility: CLINIC | Age: 73
End: 2023-12-20
Payer: MEDICARE

## 2023-12-20 VITALS
DIASTOLIC BLOOD PRESSURE: 91 MMHG | HEIGHT: 69 IN | WEIGHT: 176 LBS | SYSTOLIC BLOOD PRESSURE: 153 MMHG | HEART RATE: 103 BPM | BODY MASS INDEX: 26.07 KG/M2

## 2023-12-20 PROCEDURE — 99204 OFFICE O/P NEW MOD 45 MIN: CPT

## 2023-12-20 NOTE — DISCUSSION/SUMMARY
[de-identified] : L3-5 moderate stenosis. Discussed all options. Diclofenac PRN. Referral for lumbar physical therapy. Referred to Dr. Madelaine Good for pain management.  F/u after injection in 4 weeks. Risks of surgery include infection, dural tear, nerve root injury, reherniation, future back pain, future leg pain, retained fragment, hematoma, urinary retention, worsening leg symptoms, footdrop, anesthetic risks, blood transfusion risks, positioning pain, visceral and vascular injury, deep vein thrombosis, pulmonary embolus, and death. All risks were explained not exclusive to the ones mentioned alternatives were discussed and all questions were answered the patient agrees and understands the above and is in complete agreement with the plan.  All options discussed including rest, medicine, home exercise, acupuncture, Chiropractic care, Physical Therapy, Pain management, and last resort surgery. All questions were answered, all alternatives discussed and the patient is in complete agreement with the treatment plan which the patient contributed to and discussed with me through the shared decision-making process. Follow-up appointment as instructed. Any issues and the patient will call or come in sooner. Follow-up appointment as instructed. Any issues and the patient will call or come in sooner.

## 2023-12-20 NOTE — HISTORY OF PRESENT ILLNESS
[de-identified] : 73 year old male patient presents for an initial evaluation of right leg pain since October. Presents today for a 2nd opinion.  He states he had a fall on 10/10/23 and has been having pain since then.  He states that the pain radiates from the right buttock down to the thigh, and down the leg to the anterior tibial region and calf to the foot. Has numbness/tingling of the ankle. Sitting and walking aggravates the pain. Does not take medications for the pain.  Has been participating with PT since October. Has went to 3 different therapists. Only has mild relief. Denies INÉS. PMHx: HTN No fever chills sweats nausea vomiting no bowel or bladder dysfunction, no recent weight loss or gain no night pain. This history is in addition to the intake form that I personally reviewed. [Stable] : stable

## 2023-12-20 NOTE — ADDENDUM
[FreeTextEntry1] : This note was written by Yary Boyd on 12/20/23 acting as scribe for Dr. Gwyn Hawkins M.D. I, Gwyn Hawkins MD, have read and attest that all the information, medical decision making, and discharge instructions within are true and accurate.

## 2023-12-20 NOTE — PHYSICAL EXAM
[Normal] : Gait: normal [Carr's Sign] : negative Carr's sign [Pronator Drift] : negative pronator drift [SLR] : negative straight leg raise [de-identified] : 5 out of 5 motor strength, sensation is intact and symmetrical full range of motion flexion extension and rotation, no palpatory tenderness full range of motion of hips knees shoulders and elbows (all four extremities), no atrophy, negative straight leg raise, no pathological reflexes, no swelling, normal ambulation, no apparent distress skin is intact, no palpable lymph nodes, no upper or lower extremity instability, alert and oriented x3 and normal mood. Normal finger-to nose test. Mild left knee restricted ROM.  [de-identified] : 10/10/2023 MRI-LUMBAR SPINE NON CONTRAST   (Deepali Bautista)  HISTORY: M79.604 Right Leg Pain R26.2 Difficulty Walking Technique: Multiplanar, multisequence MRI of the lumbar spine was performed without the administration of intravenous contrast . Priors: January 7, 2016 Findings: Alignment: Grade 1 anterolisthesis L3-L4 and L4-L5 slightly increased from previous study. There is slight dextroscoliosis Conus: The conus is normal in size and signal. Bone marrow: No evidence of metastatic disease or acute vertebral body compression fracture. Discs:Multilevel disc desiccation and disc space narrowing. At L5-S1: There is interval development of foraminal disc protrusion mild to moderately narrowing right neural foramen. At L4-5: There is facet arthropathy and grade 1 anterolisthesis slightly increased from previous examination. There is moderate right and mild left neuroforaminal stenosis with impingement of exiting nerve root on the right. At L3-4: There is facet arthropathy and grade 1 anterolisthesis with moderate the spinal canal stenosis. There is left greater than right neuroforaminal stenosis with impingement of exiting nerve root on the left. Anterolisthesis is slightly increased from previous study. At L2-3: No significant spinal canal or neural foraminal stenosis. At L1-2: No significant spinal canal or neural foraminal stenosis. IMPRESSION: 1. At L5-S1: There is interval development of foraminal disc protrusion mild to moderately narrowing right neural foramen. 2. At L4-5: There is facet arthropathy and grade 1 anterolisthesis slightly increased from previous examination. There is moderate right and mild left Page 2 of 2 neuroforaminal stenosis with impingement of exiting nerve root on the right. 3. At L3-4: There is facet arthropathy and grade 1 anterolisthesis with moderate the spinal canal stenosis. There is left greater than right neuroforaminal stenosis with impingement of exiting nerve root on the left. Anterolisthesis is slightly increased from previous study.

## 2024-01-25 ENCOUNTER — APPOINTMENT (OUTPATIENT)
Dept: ORTHOPEDIC SURGERY | Facility: CLINIC | Age: 74
End: 2024-01-25
Payer: MEDICARE

## 2024-01-25 VITALS
BODY MASS INDEX: 26.36 KG/M2 | HEART RATE: 102 BPM | HEIGHT: 69 IN | WEIGHT: 178 LBS | DIASTOLIC BLOOD PRESSURE: 76 MMHG | SYSTOLIC BLOOD PRESSURE: 127 MMHG

## 2024-01-25 PROCEDURE — 99214 OFFICE O/P EST MOD 30 MIN: CPT

## 2024-02-01 ENCOUNTER — APPOINTMENT (OUTPATIENT)
Dept: UROLOGY | Facility: CLINIC | Age: 74
End: 2024-02-01
Payer: MEDICARE

## 2024-02-01 DIAGNOSIS — R97.20 ELEVATED PROSTATE, SPECIFIC ANTIGEN [PSA]: ICD-10-CM

## 2024-02-01 PROCEDURE — G2211 COMPLEX E/M VISIT ADD ON: CPT

## 2024-02-01 PROCEDURE — 99214 OFFICE O/P EST MOD 30 MIN: CPT

## 2024-02-01 RX ORDER — FINASTERIDE 5 MG/1
5 TABLET, FILM COATED ORAL DAILY
Qty: 90 | Refills: 3 | Status: ACTIVE | COMMUNITY
Start: 2020-01-30 | End: 1900-01-01

## 2024-02-01 RX ORDER — HYDROCORTISONE 25 MG/G
2.5 CREAM TOPICAL
Qty: 1 | Refills: 0 | Status: ACTIVE | OUTPATIENT
Start: 2024-02-01

## 2024-02-01 NOTE — ADDENDUM
[FreeTextEntry1] : Entered by Yulia Torres, acting as scribe for Dr. David Haines. The documentation recorded by the scribe accurately reflects the service I personally performed and the decisions made by me.

## 2024-02-01 NOTE — LETTER BODY
[FreeTextEntry1] : Jarvis Wilkins MD  [address] [place] [address], [address] [place] [address]  [phone]    Dear Dr. Wilkins,    Reason for Visit: BPH. Prostate cancer on active surveillance    This is a 73 year-old gentleman with chronic BPH and prostate cancer diagnosed in 2015. Patient was found to have a small focus of Roland 6 prostate cancer. He obtained negative prostate MRI's in 2016 and 2018. His prostate MRI in May 2018 demonstrated PI-RADS 1. He is on active surveillance. The patient returns today for follow-up. Since his last visit, the patient reports taking Flomax BID and Proscar regularly without any side effects or difficulties. He reports of stable strong uroflow and stable urinary symptoms with medical therapy. Patient denies any urinary retention, dysuria, pain, or hematuria. Patient has a small hemorrhoid. Patient denies any changes in health. The past medical history, family history and social history are unchanged. All other review of systems are negative. Patient denies any changes in medications. Medication list was reconciled. He has no known allergies to medication.    On examination, the patient is an older-appearing gentleman in no acute distress. He is alert and oriented follows commands. He has normal mood and affect. He is normocephalic. Neck is supple. Oral no thrush Respirations are unlabored. His abdomen is soft and nontender. Bladder is nonpalpable. No CVA tenderness. Neurologically he is grossly intact. No peripheral edema. Skin without gross abnormality. He has normal male external genitalia. Normal meatus. Bilateral testes are descended intrascrotally and normal to palpation. On rectal examination, there is normal sphincter tone. The prostate is clinically benign without focal induration or nodularity.    His PSA improved to 2.44. Previously was 3.8.     Assessment: BPH, stable with Flomax BID. Elevated PSA, improved on Proscar. Prostate cancer on active surveillance.    I counseled the patient. He is doing well. In terms of his BPH, the patient reports stable urinary symptoms on medical therapy. I recommended he continue taking Flomax BID. In terms of his prostate cancer, I believe he is a favorable candidate for active surveillance.  I reassured the patient as his PSA remains stable and within normal limits. I recommended he continue taking Proscar. I renewed the patient's prescription for Flomax BID and Proscar today. I encouraged the patient to continue medications regularly as directed. I recommended he continue with active surveillance of his prostate cancer. In terms of his small hemorrhoid, I recommended patient starts trial of Anusol. I discussed the potential side effects of the medication. I counseled the patient on its use and side effects. If the patient develops any side effects, the patient will discontinue the medication and contact me. He will follow-up with gastroenterology for colonoscopy. Risks and alternatives were discussed. I answered the patient questions. The patient will follow-up in 6 months and will contact me with any questions or concerns. Thank you for the opportunity to participate in the care of Mr. BARNES. I will keep you updated on his progress.    Plan:  Trial of Anusol. Continue Flomax BID and Proscar. Continue active surveillance. Follow-up with gastroenterology for colonoscopy. Follow up in 6 months.

## 2024-02-01 NOTE — HISTORY OF PRESENT ILLNESS
[FreeTextEntry1] : Follow-up prostate cancer.  Patient active surveillance.  PSA improved to 2.44.  Previously was 3.8.  Follow-up 6 months.  Follow-up BPH.  Flomax Proscar.  Patient has a small hemorrhoid.  Trial of Anusol.  Follow-up with gastroenterology for colonoscopy.  Please refer to URO Consult note

## 2024-02-26 ENCOUNTER — APPOINTMENT (OUTPATIENT)
Dept: ORTHOPEDIC SURGERY | Facility: CLINIC | Age: 74
End: 2024-02-26
Payer: MEDICARE

## 2024-02-26 VITALS
HEART RATE: 101 BPM | SYSTOLIC BLOOD PRESSURE: 149 MMHG | HEIGHT: 69 IN | BODY MASS INDEX: 26.36 KG/M2 | DIASTOLIC BLOOD PRESSURE: 81 MMHG | WEIGHT: 178 LBS

## 2024-02-26 PROCEDURE — 99214 OFFICE O/P EST MOD 30 MIN: CPT

## 2024-02-26 RX ORDER — DICLOFENAC SODIUM 75 MG/1
75 TABLET, DELAYED RELEASE ORAL
Qty: 60 | Refills: 1 | Status: ACTIVE | COMMUNITY
Start: 2023-12-20 | End: 1900-01-01

## 2024-02-26 RX ORDER — DICLOFENAC SODIUM 75 MG/1
75 TABLET, DELAYED RELEASE ORAL
Qty: 60 | Refills: 1 | Status: ACTIVE | COMMUNITY
Start: 2024-02-26 | End: 1900-01-01

## 2024-02-26 NOTE — DISCUSSION/SUMMARY
[de-identified] : L3-5 moderate-severe stenosis. Feeling better after 5% 2nd INÉS. Discussed all options. Referral for lumbar and right physical therapy. Patient to try 3rd INÉS with Dr. Madelaine Good. F/U 3-4 weeks if no better. Diclofenac PRN. All options discussed including rest, medicine, home exercise, acupuncture, Chiropractic care, Physical Therapy, Pain management, and last resort surgery. All questions were answered, all alternatives discussed and the patient is in complete agreement with the treatment plan which the patient contributed to and discussed with me through the shared decision-making process. Follow-up appointment as instructed. Any issues and the patient will call or come in sooner. Follow-up appointment as instructed. Any issues and the patient will call or come in sooner.

## 2024-02-26 NOTE — ADDENDUM
[FreeTextEntry1] : This note was written by Krish Bryan on 02/26/2024 acting as scribe for Dr. Gwyn Hawkins M.D.  I, Gwyn Hawkins MD, have read and attest that all the information, medical decision making and discharge instructions within are true and accurate.

## 2024-02-26 NOTE — HISTORY OF PRESENT ILLNESS
[Stable] : stable [de-identified] : 73 year old male patient presents for evaluation of right leg pain since October.   He states he had a fall on 10/10/23 and has been having pain since then.  He states that the pain radiates from the right buttock down to the thigh, and down the leg to the anterior tibial region and calf to the foot. Has numbness/tingling of the ankle. Sitting and walking aggravates the pain. Does not take medications for the pain.  Has been participating with PT since October. Has went to 3 different therapists. Only has mild relief. Denies INÉS. Was referred to PT and pain management at last visit. S/P LESI x 1 in early January with Dr. Good which provided about 30% relief.  He underwent L3-5 facet joint injections with Dr. Good on 2/7/24, and only felt about 5 % relief.  Started PT, but stopped PT as per Dr. Good's recommendation since his latest injection. So far, the PT has been helping.  He states that he feels right hip locking as well.  PMHx: HTN No fever chills sweats nausea vomiting no bowel or bladder dysfunction, no recent weight loss or gain no night pain. This history is in addition to the intake form that I personally reviewed.

## 2024-02-26 NOTE — PHYSICAL EXAM
[Normal] : Gait: normal [Carr's Sign] : negative Carr's sign [SLR] : negative straight leg raise [Pronator Drift] : negative pronator drift [de-identified] : 5 out of 5 motor strength, sensation is intact and symmetrical full range of motion flexion extension and rotation, no palpatory tenderness full range of motion of knees shoulders and elbows, no atrophy, negative straight leg raise, no pathological reflexes, no swelling, normal ambulation, no apparent distress skin is intact, no palpable lymph nodes, no upper or lower extremity instability, alert and oriented x3 and normal mood. Normal finger-to nose test. Mild left knee restricted ROM.  Restricted right hip ROM.  [de-identified] : I reviewed, interpreted and clinically correlated the following outside imaging studies,  10/10/2023 MRI-LUMBAR SPINE NON CONTRAST   (Deepali Bautista)  HISTORY: M79.604 Right Leg Pain R26.2 Difficulty Walking Technique: Multiplanar, multisequence MRI of the lumbar spine was performed without the administration of intravenous contrast . Priors: January 7, 2016 Findings: Alignment: Grade 1 anterolisthesis L3-L4 and L4-L5 slightly increased from previous study. There is slight dextroscoliosis Conus: The conus is normal in size and signal. Bone marrow: No evidence of metastatic disease or acute vertebral body compression fracture. Discs:Multilevel disc desiccation and disc space narrowing. At L5-S1: There is interval development of foraminal disc protrusion mild to moderately narrowing right neural foramen. At L4-5: There is facet arthropathy and grade 1 anterolisthesis slightly increased from previous examination. There is moderate right and mild left neuroforaminal stenosis with impingement of exiting nerve root on the right. At L3-4: There is facet arthropathy and grade 1 anterolisthesis with moderate the spinal canal stenosis. There is left greater than right neuroforaminal stenosis with impingement of exiting nerve root on the left. Anterolisthesis is slightly increased from previous study. At L2-3: No significant spinal canal or neural foraminal stenosis. At L1-2: No significant spinal canal or neural foraminal stenosis. IMPRESSION: 1. At L5-S1: There is interval development of foraminal disc protrusion mild to moderately narrowing right neural foramen. 2. At L4-5: There is facet arthropathy and grade 1 anterolisthesis slightly increased from previous examination. There is moderate right and mild left Page 2 of 2 neuroforaminal stenosis with impingement of exiting nerve root on the right. 3. At L3-4: There is facet arthropathy and grade 1 anterolisthesis with moderate the spinal canal stenosis. There is left greater than right neuroforaminal stenosis with impingement of exiting nerve root on the left. Anterolisthesis is slightly increased from previous study.

## 2024-03-27 ENCOUNTER — APPOINTMENT (OUTPATIENT)
Dept: ORTHOPEDIC SURGERY | Facility: CLINIC | Age: 74
End: 2024-03-27
Payer: MEDICARE

## 2024-03-27 VITALS — WEIGHT: 178 LBS | HEIGHT: 69 IN | BODY MASS INDEX: 26.36 KG/M2

## 2024-03-27 DIAGNOSIS — M51.36 OTHER INTERVERTEBRAL DISC DEGENERATION, LUMBAR REGION: ICD-10-CM

## 2024-03-27 DIAGNOSIS — M54.16 RADICULOPATHY, LUMBAR REGION: ICD-10-CM

## 2024-03-27 PROCEDURE — 99214 OFFICE O/P EST MOD 30 MIN: CPT

## 2024-03-27 NOTE — HISTORY OF PRESENT ILLNESS
[Stable] : stable [de-identified] : 73 year old male patient presents for evaluation of right leg pain since October.   He states he had a fall on 10/10/23 and has been having pain since then.  He states that the pain radiates from the right buttock down to the thigh, and down the leg to the anterior tibial region and calf to the foot. Has numbness/tingling of the ankle. Sitting and walking aggravates the pain. Does not take medications for the pain.  Has been participating with PT since October. Has went to 3 different therapists. Only has mild relief. Was referred to PT and pain management at last visit. S/P LESI x 1 in early January with Dr. Good which provided about 30% relief.  He underwent L3-5 facet joint injections with Dr. Good on 2/7/24, and only felt about 5% relief.  Has been undergoing PT, and states that he feels improvement with PT. Is feeling better.  He states that he feels right hip locking as well.  PMHx: HTN No fever chills sweats nausea vomiting no bowel or bladder dysfunction, no recent weight loss or gain no night pain. This history is in addition to the intake form that I personally reviewed.

## 2024-03-27 NOTE — PHYSICAL EXAM
[Normal] : Gait: normal [Carr's Sign] : negative Carr's sign [Pronator Drift] : negative pronator drift [SLR] : negative straight leg raise [de-identified] : 5 out of 5 motor strength, sensation is intact and symmetrical full range of motion flexion extension and rotation, no palpatory tenderness full range of motion of knees shoulders and elbows, no atrophy, negative straight leg raise, no pathological reflexes, no swelling, normal ambulation, no apparent distress skin is intact, no palpable lymph nodes, no upper or lower extremity instability, alert and oriented x3 and normal mood. Normal finger-to nose test. Mild left knee restricted ROM.  Restricted right hip ROM.  [de-identified] : I reviewed, interpreted and clinically correlated the following outside imaging studies,  10/10/2023 MRI-LUMBAR SPINE NON CONTRAST   (Deepali Bautista)  HISTORY: M79.604 Right Leg Pain R26.2 Difficulty Walking Technique: Multiplanar, multisequence MRI of the lumbar spine was performed without the administration of intravenous contrast . Priors: January 7, 2016 Findings: Alignment: Grade 1 anterolisthesis L3-L4 and L4-L5 slightly increased from previous study. There is slight dextroscoliosis Conus: The conus is normal in size and signal. Bone marrow: No evidence of metastatic disease or acute vertebral body compression fracture. Discs:Multilevel disc desiccation and disc space narrowing. At L5-S1: There is interval development of foraminal disc protrusion mild to moderately narrowing right neural foramen. At L4-5: There is facet arthropathy and grade 1 anterolisthesis slightly increased from previous examination. There is moderate right and mild left neuroforaminal stenosis with impingement of exiting nerve root on the right. At L3-4: There is facet arthropathy and grade 1 anterolisthesis with moderate the spinal canal stenosis. There is left greater than right neuroforaminal stenosis with impingement of exiting nerve root on the left. Anterolisthesis is slightly increased from previous study. At L2-3: No significant spinal canal or neural foraminal stenosis. At L1-2: No significant spinal canal or neural foraminal stenosis. IMPRESSION: 1. At L5-S1: There is interval development of foraminal disc protrusion mild to moderately narrowing right neural foramen. 2. At L4-5: There is facet arthropathy and grade 1 anterolisthesis slightly increased from previous examination. There is moderate right and mild left Page 2 of 2 neuroforaminal stenosis with impingement of exiting nerve root on the right. 3. At L3-4: There is facet arthropathy and grade 1 anterolisthesis with moderate the spinal canal stenosis. There is left greater than right neuroforaminal stenosis with impingement of exiting nerve root on the left. Anterolisthesis is slightly increased from previous study.

## 2024-03-27 NOTE — DISCUSSION/SUMMARY
[PRN] : PRN [de-identified] : L3-5 moderate-severe stenosis. Right sided Lumbar radiculopathy, getting better. Hold off on 3rd injection. Feeling better after 2nd INÉS. Discussed all options. PT for Lumbar and right hip for 2 weeks. Provided Diclofenac PRN All options discussed including rest, medicine, home exercise, acupuncture, Chiropractic care, Physical Therapy, Pain management, and last resort surgery. All questions were answered, all alternatives discussed, and the patient is in complete agreement with the treatment plan which the patient contributed to and discussed with me through the shared decision-making process. Follow-up appointment as instructed. Any issues and the patient will call or come in sooner. Follow-up appointment as instructed. Any issues and the patient will call or come in sooner.

## 2024-03-27 NOTE — ADDENDUM
[FreeTextEntry1] : This note was written by Gela Casas on 03/27/2024 acting as scribe for Dr. Gwyn Hawkins M.D.   I, Gwyn Hawkins MD, have read and attest that all the information, medical decision making and discharge instructions within are true and accurate.

## 2024-05-08 ENCOUNTER — APPOINTMENT (OUTPATIENT)
Dept: ORTHOPEDIC SURGERY | Facility: CLINIC | Age: 74
End: 2024-05-08
Payer: MEDICARE

## 2024-05-08 VITALS — WEIGHT: 178 LBS | BODY MASS INDEX: 26.36 KG/M2 | HEIGHT: 69 IN

## 2024-05-08 DIAGNOSIS — M48.07 SPINAL STENOSIS, LUMBOSACRAL REGION: ICD-10-CM

## 2024-05-08 PROCEDURE — 99214 OFFICE O/P EST MOD 30 MIN: CPT

## 2024-05-08 NOTE — HISTORY OF PRESENT ILLNESS
[de-identified] : 73 year old male patient presents for evaluation of right leg pain since October.   He states he had a fall on 10/10/23 and has been having pain since then.  He states that the pain radiates from the right buttock down to the thigh, and down the leg to the anterior tibial region and calf to the foot. Has numbness/tingling of the ankle. Sitting and walking aggravates the pain. Does not take medications for the pain.  Has been participating with PT since October. Has went to 3 different therapists. Only has mild relief. Was referred to PT and pain management at last visit. S/P LESI x 1 in early January with Dr. Good which provided about 30% relief.  He underwent L3-5 facet joint injections with Dr. Good on 2/7/24, and only felt about 5% relief.  Has been undergoing PT, and states that he feels improvement with PT. Is feeling better.  Wants PT renewal.  He states that he feels right hip locking intermittently as well.  PMHx: HTN No fever chills sweats nausea vomiting no bowel or bladder dysfunction, no recent weight loss or gain no night pain. This history is in addition to the intake form that I personally reviewed. [Improving] : improving

## 2024-05-08 NOTE — PHYSICAL EXAM
[Normal] : Gait: normal [Carr's Sign] : negative Carr's sign [Pronator Drift] : negative pronator drift [SLR] : negative straight leg raise [de-identified] : 5 out of 5 motor strength, sensation is intact and symmetrical full range of motion flexion extension and rotation, no palpatory tenderness full range of motion of knees shoulders and elbows, no atrophy, negative straight leg raise, no pathological reflexes, no swelling, normal ambulation, no apparent distress skin is intact, no palpable lymph nodes, no upper or lower extremity instability, alert and oriented x3 and normal mood. Normal finger-to nose test. Mild left knee restricted ROM.  Restricted right hip ROM.  [de-identified] : I reviewed, interpreted and clinically correlated the following outside imaging studies,  10/10/2023 MRI-LUMBAR SPINE NON CONTRAST   (Deepali Bautista)  HISTORY: M79.604 Right Leg Pain R26.2 Difficulty Walking Technique: Multiplanar, multisequence MRI of the lumbar spine was performed without the administration of intravenous contrast . Priors: January 7, 2016 Findings: Alignment: Grade 1 anterolisthesis L3-L4 and L4-L5 slightly increased from previous study. There is slight dextroscoliosis Conus: The conus is normal in size and signal. Bone marrow: No evidence of metastatic disease or acute vertebral body compression fracture. Discs:Multilevel disc desiccation and disc space narrowing. At L5-S1: There is interval development of foraminal disc protrusion mild to moderately narrowing right neural foramen. At L4-5: There is facet arthropathy and grade 1 anterolisthesis slightly increased from previous examination. There is moderate right and mild left neuroforaminal stenosis with impingement of exiting nerve root on the right. At L3-4: There is facet arthropathy and grade 1 anterolisthesis with moderate the spinal canal stenosis. There is left greater than right neuroforaminal stenosis with impingement of exiting nerve root on the left. Anterolisthesis is slightly increased from previous study. At L2-3: No significant spinal canal or neural foraminal stenosis. At L1-2: No significant spinal canal or neural foraminal stenosis. IMPRESSION: 1. At L5-S1: There is interval development of foraminal disc protrusion mild to moderately narrowing right neural foramen. 2. At L4-5: There is facet arthropathy and grade 1 anterolisthesis slightly increased from previous examination. There is moderate right and mild left Page 2 of 2 neuroforaminal stenosis with impingement of exiting nerve root on the right. 3. At L3-4: There is facet arthropathy and grade 1 anterolisthesis with moderate the spinal canal stenosis. There is left greater than right neuroforaminal stenosis with impingement of exiting nerve root on the left. Anterolisthesis is slightly increased from previous study.

## 2024-05-08 NOTE — DISCUSSION/SUMMARY
[PRN] : PRN [de-identified] : L3-5 moderate-severe stenosis. Right sided Lumbar radiculopathy, getting better. Hold off on 3rd injection. Feeling better after 2nd INÉS. Discussed all options. 10/10-1-2/10. Continue PT. All options discussed including rest, medicine, home exercise, acupuncture, Chiropractic care, Physical Therapy, Pain management, and last resort surgery. All questions were answered, all alternatives discussed, and the patient is in complete agreement with the treatment plan which the patient contributed to and discussed with me through the shared decision-making process. Follow-up appointment as instructed. Any issues and the patient will call or come in sooner. Follow-up appointment as instructed. Any issues and the patient will call or come in sooner.

## 2024-05-30 NOTE — ED ADULT TRIAGE NOTE - SOURCE OF INFORMATION
Rosalie from [facility] called needing clarification on directions for patient's Cimzia.   Patient

## 2024-10-06 PROBLEM — Z08 ENCOUNTER FOR FOLLOW-UP SURVEILLANCE OF PROSTATE CANCER: Status: ACTIVE | Noted: 2024-10-06

## 2024-10-07 ENCOUNTER — APPOINTMENT (OUTPATIENT)
Dept: UROLOGY | Facility: CLINIC | Age: 74
End: 2024-10-07
Payer: MEDICARE

## 2024-10-07 DIAGNOSIS — N40.1 BENIGN PROSTATIC HYPERPLASIA WITH LOWER URINARY TRACT SYMPMS: ICD-10-CM

## 2024-10-07 DIAGNOSIS — C61 MALIGNANT NEOPLASM OF PROSTATE: ICD-10-CM

## 2024-10-07 PROCEDURE — G2211 COMPLEX E/M VISIT ADD ON: CPT

## 2024-10-07 PROCEDURE — 99214 OFFICE O/P EST MOD 30 MIN: CPT

## 2024-10-18 ENCOUNTER — RESULT REVIEW (OUTPATIENT)
Age: 74
End: 2024-10-18

## 2024-10-18 ENCOUNTER — OUTPATIENT (OUTPATIENT)
Dept: OUTPATIENT SERVICES | Facility: HOSPITAL | Age: 74
LOS: 1 days | End: 2024-10-18
Payer: MEDICARE

## 2024-10-18 ENCOUNTER — APPOINTMENT (OUTPATIENT)
Dept: MRI IMAGING | Facility: IMAGING CENTER | Age: 74
End: 2024-10-18

## 2024-10-18 DIAGNOSIS — Z08 ENCOUNTER FOR FOLLOW-UP EXAMINATION AFTER COMPLETED TREATMENT FOR MALIGNANT NEOPLASM: ICD-10-CM

## 2024-10-18 DIAGNOSIS — N40.1 BENIGN PROSTATIC HYPERPLASIA WITH LOWER URINARY TRACT SYMPTOMS: ICD-10-CM

## 2024-10-18 PROCEDURE — 72197 MRI PELVIS W/O & W/DYE: CPT | Mod: 26,MH

## 2024-10-18 PROCEDURE — 76498P: CUSTOM | Mod: 26,MH

## 2024-10-26 RX ORDER — LOTEPREDNOL ETABONATE 5 MG/G
2 GEL OPHTHALMIC
Qty: 1 | Refills: 2
Start: 2024-10-26

## 2024-11-20 PROCEDURE — 72197 MRI PELVIS W/O & W/DYE: CPT

## 2024-11-20 PROCEDURE — A9585: CPT

## 2024-11-20 PROCEDURE — 76498 UNLISTED MR PROCEDURE: CPT

## 2025-03-04 ENCOUNTER — RX RENEWAL (OUTPATIENT)
Age: 75
End: 2025-03-04

## 2025-04-10 ENCOUNTER — APPOINTMENT (OUTPATIENT)
Dept: UROLOGY | Facility: CLINIC | Age: 75
End: 2025-04-10
Payer: MEDICARE

## 2025-04-10 DIAGNOSIS — Z85.46 ENCOUNTER FOR FOLLOW-UP EXAMINATION AFTER COMPLETED TREATMENT FOR MALIGNANT NEOPLASM: ICD-10-CM

## 2025-04-10 DIAGNOSIS — Z08 ENCOUNTER FOR FOLLOW-UP EXAMINATION AFTER COMPLETED TREATMENT FOR MALIGNANT NEOPLASM: ICD-10-CM

## 2025-04-10 PROCEDURE — G2211 COMPLEX E/M VISIT ADD ON: CPT

## 2025-04-10 PROCEDURE — 99214 OFFICE O/P EST MOD 30 MIN: CPT
